# Patient Record
Sex: MALE | Race: BLACK OR AFRICAN AMERICAN | Employment: FULL TIME | URBAN - METROPOLITAN AREA
[De-identification: names, ages, dates, MRNs, and addresses within clinical notes are randomized per-mention and may not be internally consistent; named-entity substitution may affect disease eponyms.]

---

## 2017-03-20 ENCOUNTER — OFFICE VISIT (OUTPATIENT)
Dept: FAMILY MEDICINE CLINIC | Facility: CLINIC | Age: 52
End: 2017-03-20

## 2017-03-20 VITALS
RESPIRATION RATE: 14 BRPM | BODY MASS INDEX: 32.58 KG/M2 | HEIGHT: 68 IN | WEIGHT: 215 LBS | HEART RATE: 77 BPM | OXYGEN SATURATION: 98 % | SYSTOLIC BLOOD PRESSURE: 124 MMHG | TEMPERATURE: 98 F | DIASTOLIC BLOOD PRESSURE: 86 MMHG

## 2017-03-20 DIAGNOSIS — M25.531 RIGHT WRIST PAIN: Primary | ICD-10-CM

## 2017-03-20 DIAGNOSIS — Z87.39 HISTORY OF GOUT: ICD-10-CM

## 2017-03-20 PROCEDURE — 99213 OFFICE O/P EST LOW 20 MIN: CPT | Performed by: NURSE PRACTITIONER

## 2017-03-20 RX ORDER — PREDNISONE 20 MG/1
TABLET ORAL
Qty: 20 TABLET | Refills: 0 | Status: SHIPPED | OUTPATIENT
Start: 2017-03-20 | End: 2017-05-23 | Stop reason: ALTCHOICE

## 2017-03-20 NOTE — PATIENT INSTRUCTIONS
Eating to Prevent Gout  Gout is a painful form of arthritis caused by an excess of uric acid. This is a waste product made by the body. It builds up in the body and forms crystals that collect in the joints, bringing on a gout attack.  Alcohol and certain The following guidelines are recommended by the [address] [place] [address] for people with gout. Your diet should be:  · High in fiber, whole grains, fruits, and vegetables. · Low in protein (15% of calories should come from protein.  Choose lean sources · Other high fat foods such as gravy, whole milk, and high fat cheeses  · Vegetables such as asparagus, cauliflower, spinach, and mushrooms used to be thought to contribute to an increased risk for a gout attack, but recent studies show that high purine ve

## 2017-03-20 NOTE — PROGRESS NOTES
CHIEF COMPLAINT:     Patient presents with:  Hand Pain      HPI:   Stefany Madrigal is a 49year old male who presents with complaints of right wrist pain and swelling, base of thumb, pain was 9/10 upon awakening, this pain/swelling has been intermiitent for m LUNGS: Denies shortness of breath, cough, or wheezing  GI: Denies abdominal pain, N/V/C/D.   MUSCULOSKELETAL: no arthralgia or swollen joints  LYMPH:  Denies lymphadenopathy  NEURO: Denies headaches or lightheadedness      EXAM:   /86 mmHg  Pulse 77 Gout is a painful form of arthritis caused by an excess of uric acid. This is a waste product made by the body. It builds up in the body and forms crystals that collect in the joints, bringing on a gout attack.  Alcohol and certain foods can trigger a gout The following guidelines are recommended by the [address] [place] [address] for people with gout. Your diet should be:  · High in fiber, whole grains, fruits, and vegetables. · Low in protein (15% of calories should come from protein.  Choose lean sources · Other high fat foods such as gravy, whole milk, and high fat cheeses  · Vegetables such as asparagus, cauliflower, spinach, and mushrooms used to be thought to contribute to an increased risk for a gout attack, but recent studies show that high purine ve

## 2017-05-23 ENCOUNTER — OFFICE VISIT (OUTPATIENT)
Dept: FAMILY MEDICINE CLINIC | Facility: CLINIC | Age: 52
End: 2017-05-23

## 2017-05-23 VITALS
DIASTOLIC BLOOD PRESSURE: 78 MMHG | SYSTOLIC BLOOD PRESSURE: 110 MMHG | TEMPERATURE: 98 F | RESPIRATION RATE: 15 BRPM | HEART RATE: 96 BPM | OXYGEN SATURATION: 98 %

## 2017-05-23 DIAGNOSIS — B34.9 VIRAL SYNDROME: Primary | ICD-10-CM

## 2017-05-23 PROCEDURE — 99213 OFFICE O/P EST LOW 20 MIN: CPT | Performed by: PHYSICIAN ASSISTANT

## 2017-05-23 NOTE — PROGRESS NOTES
CHIEF COMPLAINT:   Patient presents with:  URI: fever, bodyache 2 days duration. HPI:   Hiram Chong is a 49year old male who presents for fever for  2  days Reports Tmax of 101. Patient also reports associated symptoms of:  bodyache.   Denies headac shortness of breath with exertion or rest. No cough or wheezing  GI: no abdominal pain, No N/V/C/D. No jaundice. JOINTS: no arthralgia or swollen joints  NEURO: no headaches.       EXAM:   /78 mmHg  Pulse 96  Temp(Src) 98.1 °F (36.7 °C) (Oral)  Resp

## 2017-08-09 PROBLEM — Z79.899 ON STATIN THERAPY: Status: ACTIVE | Noted: 2017-08-09

## 2017-11-10 ENCOUNTER — OFFICE VISIT (OUTPATIENT)
Dept: FAMILY MEDICINE CLINIC | Facility: CLINIC | Age: 52
End: 2017-11-10

## 2017-11-10 VITALS
TEMPERATURE: 98 F | HEIGHT: 67 IN | SYSTOLIC BLOOD PRESSURE: 120 MMHG | HEART RATE: 90 BPM | BODY MASS INDEX: 33.27 KG/M2 | RESPIRATION RATE: 14 BRPM | DIASTOLIC BLOOD PRESSURE: 86 MMHG | WEIGHT: 212 LBS

## 2017-11-10 DIAGNOSIS — M79.604 PAIN OF RIGHT LOWER EXTREMITY: Primary | ICD-10-CM

## 2017-11-10 DIAGNOSIS — Z87.39 H/O: GOUT: ICD-10-CM

## 2017-11-10 PROCEDURE — 99213 OFFICE O/P EST LOW 20 MIN: CPT | Performed by: NURSE PRACTITIONER

## 2017-11-10 RX ORDER — PREDNISONE 20 MG/1
TABLET ORAL
Qty: 10 TABLET | Refills: 0 | Status: SHIPPED | OUTPATIENT
Start: 2017-11-10 | End: 2018-01-26 | Stop reason: ALTCHOICE

## 2017-11-10 NOTE — PROGRESS NOTES
CHIEF COMPLAINT:     Patient presents with: Foot Pain      HPI:   Isidro Heller is a 47year old male who presents with complaints of pain to right foot. Began acutely yesterday, pain 8/10, persistent. Bearing weight exacerbates pain.   Pt reports h/o gou EYES: conjunctiva clear,  NOSE: nostrils patent  NECK: supple, non-tender  LUNGS: clear to auscultation bilaterally, no wheezes or rhonchi. Breathing is non labored.   CARDIO: RRR without murmur  EXTREMITIES: right foot, + erythema, pain and swelling noted Eating too many foods containing purines may increase the levels of uric acid in your body and increase your risk for a gout attack. It may be best to limit these high-purine foods:  · Alcohol (beer, red wine). You may be told to avoid alcohol completely. The patient indicates understanding of these issues and agrees to the plan.

## 2017-11-10 NOTE — PATIENT INSTRUCTIONS
Eating to Prevent Gout  Gout is a painful form of arthritis caused by an excess of uric acid. This is a waste product made by the body. It builds up in the body and forms crystals that collect in the joints, bringing on a gout attack.  Alcohol and certain The following guidelines are recommended by the [address] [place] [address] for people with gout. Your diet should be:  · High in fiber, whole grains, fruits, and vegetables. · Low in protein (15% of calories should come from protein.  Choose lean sources

## 2017-11-13 PROCEDURE — 84154 ASSAY OF PSA FREE: CPT | Performed by: PHYSICIAN ASSISTANT

## 2017-11-13 PROCEDURE — 84153 ASSAY OF PSA TOTAL: CPT | Performed by: PHYSICIAN ASSISTANT

## 2018-01-26 ENCOUNTER — OFFICE VISIT (OUTPATIENT)
Dept: FAMILY MEDICINE CLINIC | Facility: CLINIC | Age: 53
End: 2018-01-26

## 2018-01-26 VITALS
HEART RATE: 90 BPM | TEMPERATURE: 98 F | DIASTOLIC BLOOD PRESSURE: 90 MMHG | SYSTOLIC BLOOD PRESSURE: 110 MMHG | RESPIRATION RATE: 16 BRPM | OXYGEN SATURATION: 98 %

## 2018-01-26 DIAGNOSIS — H61.22 IMPACTED CERUMEN OF LEFT EAR: Primary | ICD-10-CM

## 2018-01-26 DIAGNOSIS — H91.92 HEARING LOSS OF LEFT EAR, UNSPECIFIED HEARING LOSS TYPE: ICD-10-CM

## 2018-01-26 PROCEDURE — 99213 OFFICE O/P EST LOW 20 MIN: CPT | Performed by: PHYSICIAN ASSISTANT

## 2018-01-26 RX ORDER — FLUTICASONE PROPIONATE 50 MCG
2 SPRAY, SUSPENSION (ML) NASAL DAILY
Qty: 1 INHALER | Refills: 0 | Status: SHIPPED | OUTPATIENT
Start: 2018-01-26 | End: 2018-07-06 | Stop reason: ALTCHOICE

## 2018-01-26 NOTE — PATIENT INSTRUCTIONS
Earwax Removal    The ear canal makes earwax from the canal’s lining. The ears make wax to lubricate and protect the ear canal. The ear canal is the tube that connects the middle ear to the outside of the ear.  The wax protects the ear from bacteria, infe · Don’t use cotton swabs in your ears. Cotton swabs may push wax deeper into the ear canal or damage the eardrum.  Use cotton gauze or a wet washcloth  to gently remove wax on the outside of the ear and around the opening to the ear canal.  · Don't use any © 9949-4030 [employer] [address]. [address] [facility] [place] [place]. All rights reserved. This information is not intended as a substitute for professional medical care. Always follow your healthcare professional's instructions.         Jonathon · Limit alcohol and caffeine. · Try to limit stress. During Attacks  If an attack is about to start or has started, take any medicine you have been prescribed for an attack. Lie down on a firm surface in a darkened room. Stay as still as possible.  Stay a

## 2018-01-26 NOTE — PROGRESS NOTES
CHIEF COMPLAINT:   Patient presents with:  Ear Problem: cant hear as well out of left ear. HPI:     Filomena Howard is a 47year old male who presents to clinic today with complaints of not hearing well out of the left ear. Has had for 3  days.    Amirah Valdez suspicious lesions  HEAD: atraumatic, normocephalic  EYES: conjunctiva clear, sclera non icteric. PERRLA. No nystagmus. EARS: non tender with manipulation. External auditory canals are patent on the right but occluded on the left.    Right TM: pearly g Comfort measures as described in Patient Instructions    Meds & Refills for this Visit:    Signed Prescriptions Disp Refills    Fluticasone Propionate 50 MCG/ACT Nasal Suspension 1 Inhaler 0      Si sprays by Each Nare route daily.              Risk

## 2018-01-30 PROBLEM — H83.02 LABYRINTHITIS OF LEFT EAR: Status: ACTIVE | Noted: 2018-01-30

## 2018-03-06 PROCEDURE — 82164 ANGIOTENSIN I ENZYME TEST: CPT | Performed by: OTOLARYNGOLOGY

## 2018-03-06 PROCEDURE — 86618 LYME DISEASE ANTIBODY: CPT | Performed by: OTOLARYNGOLOGY

## 2018-05-10 PROCEDURE — 87086 URINE CULTURE/COLONY COUNT: CPT | Performed by: PHYSICIAN ASSISTANT

## 2018-05-10 PROCEDURE — 87491 CHLMYD TRACH DNA AMP PROBE: CPT | Performed by: PHYSICIAN ASSISTANT

## 2018-05-10 PROCEDURE — 87591 N.GONORRHOEAE DNA AMP PROB: CPT | Performed by: PHYSICIAN ASSISTANT

## 2018-07-06 ENCOUNTER — OFFICE VISIT (OUTPATIENT)
Dept: FAMILY MEDICINE CLINIC | Facility: CLINIC | Age: 53
End: 2018-07-06

## 2018-07-06 VITALS
HEART RATE: 86 BPM | RESPIRATION RATE: 16 BRPM | TEMPERATURE: 98 F | SYSTOLIC BLOOD PRESSURE: 118 MMHG | DIASTOLIC BLOOD PRESSURE: 80 MMHG | OXYGEN SATURATION: 98 %

## 2018-07-06 DIAGNOSIS — M70.21 OLECRANON BURSITIS OF RIGHT ELBOW: Primary | ICD-10-CM

## 2018-07-06 DIAGNOSIS — M10.9 ACUTE GOUT OF RIGHT ELBOW, UNSPECIFIED CAUSE: ICD-10-CM

## 2018-07-06 PROCEDURE — 99213 OFFICE O/P EST LOW 20 MIN: CPT | Performed by: PHYSICIAN ASSISTANT

## 2018-07-06 RX ORDER — CEFADROXIL 500 MG/1
CAPSULE ORAL
Qty: 14 CAPSULE | Refills: 0 | Status: SHIPPED | OUTPATIENT
Start: 2018-07-06 | End: 2018-07-31

## 2018-07-06 RX ORDER — INDOMETHACIN 50 MG/1
50 CAPSULE ORAL
Qty: 30 CAPSULE | Refills: 0 | Status: SHIPPED | OUTPATIENT
Start: 2018-07-06 | End: 2018-07-16

## 2018-07-06 NOTE — PROGRESS NOTES
CHIEF COMPLAINT:     Patient presents with:  Elbow Pain: right elbow. HPI:   Mago Prather is a 47year old left handed male who presents with complaints of right elbow pain and swelling.   He notes yesterday developed some posterior right elbow pain an Denies chest pain, or palpitations  LUNGS: Denies shortness of breath, cough, or wheezing  GI: Denies abdominal pain, N/V/C/D.   MUSCULOSKELETAL: per HPI.   LYMPH:  Denies lymphadenopathy  NEURO: Denies headaches or lightheadedness      EXAM:   /80 protein diet and etoh. Follow up PCP.   Comfort care instructions as listed in Patient Instructions    Meds & Refills for this Visit:    Signed Prescriptions Disp Refills    indomethacin 50 MG Oral Cap 30 capsule 0      Sig: Take 1 capsule (50 mg total) b

## 2018-07-31 PROBLEM — M70.21 OLECRANON BURSITIS OF RIGHT ELBOW: Status: ACTIVE | Noted: 2018-07-31

## 2018-11-12 PROBLEM — M70.21 OLECRANON BURSITIS OF RIGHT ELBOW: Status: RESOLVED | Noted: 2018-07-31 | Resolved: 2018-11-12

## 2018-11-12 PROBLEM — H83.02 LABYRINTHITIS OF LEFT EAR: Status: RESOLVED | Noted: 2018-01-30 | Resolved: 2018-11-12

## 2019-01-03 PROCEDURE — 88305 TISSUE EXAM BY PATHOLOGIST: CPT | Performed by: INTERNAL MEDICINE

## 2019-07-05 PROCEDURE — 87086 URINE CULTURE/COLONY COUNT: CPT | Performed by: INTERNAL MEDICINE

## 2019-12-11 ENCOUNTER — OFFICE VISIT (OUTPATIENT)
Dept: FAMILY MEDICINE CLINIC | Facility: CLINIC | Age: 54
End: 2019-12-11
Payer: COMMERCIAL

## 2019-12-11 VITALS
WEIGHT: 224 LBS | BODY MASS INDEX: 33.95 KG/M2 | DIASTOLIC BLOOD PRESSURE: 94 MMHG | OXYGEN SATURATION: 98 % | HEIGHT: 68 IN | SYSTOLIC BLOOD PRESSURE: 128 MMHG | HEART RATE: 78 BPM | TEMPERATURE: 98 F | RESPIRATION RATE: 16 BRPM

## 2019-12-11 DIAGNOSIS — M10.9 ACUTE GOUT INVOLVING TOE OF RIGHT FOOT, UNSPECIFIED CAUSE: Primary | ICD-10-CM

## 2019-12-11 PROCEDURE — 99213 OFFICE O/P EST LOW 20 MIN: CPT | Performed by: PHYSICIAN ASSISTANT

## 2019-12-11 RX ORDER — PREDNISONE 20 MG/1
TABLET ORAL
Qty: 13 TABLET | Refills: 0 | Status: SHIPPED | OUTPATIENT
Start: 2019-12-11 | End: 2020-03-04 | Stop reason: ALTCHOICE

## 2019-12-11 NOTE — PATIENT INSTRUCTIONS
Gout    Gout is an inflammation of a joint due to a build-up of gout crystals in the joint fluid. This occurs when there is an excess of uric acid (a normal waste product) in the body.  Uric acid builds up in the body when the kidneys are unable to filter · If pain medicines have been prescribed, take them exactly as directed.    Preventing attacks  · Minimize or avoid alcohol use. Excess alcohol intake can cause a gout attack. · Limit these foods and beverages:  ? Organ meats, such as kidneys and liver  ? Gout is a painful condition caused by an excess of uric acid, a waste product made by the body. Uric acid forms crystals that collect in the joints. The immune response to these crystals brings on symptoms of joint pain and swelling.  This is called a gout · Complex carbohydrate foods, including whole grains, brown rice, oats, and beans  · Coffee, in moderation  · Water, approximately 64 ounces per day  Follow-up care  Follow up with your healthcare provider as advised.   When to seek medical advice  Call you

## 2019-12-11 NOTE — PROGRESS NOTES
CHIEF COMPLAINT:     Patient presents with:  Gout      HPI:   Stefany Madrigal is a 49year old male  who presents with complaints of \"gout. \" right great toe has been hurting for since this morning.   Patient  has history of gout in the past.  Pain is rated a Anti inflammatories as listed above. Push fluids to hydrate, minimize ETOH and high protein diets. Follow up with PCP to discuss maintenance therapies. Medication risks reviewed. Watch for signs of acute kidney injury, or GI bleeding.   Delay flu shot to · Apply an ice pack (ice cubes in a plastic bag wrapped in a thin towel) over the injured area for 20 minutes every 1 to 2 hours the first day for pain relief. Continue this 3 to 4 times a day for swelling and pain.   · Avoid alcohol and foods listed below © 0262-9903 [employer] [address]. [address] [facility] [place] [place]. All rights reserved. This information is not intended as a substitute for professional medical care. Always follow your healthcare professional's instructions.         Hollie Encarnacion · Omega fatty acids. These are found in some fatty fish such as salmon, certain oils (flax, olive, or nut), and nuts themselves. Omega fatty acids may help prevent inflammation due to gout.   · Dairy products that are low-fat or fat-free, such as cheese and

## (undated) NOTE — LETTER
Date: 12/11/2019    Patient Name: Felipe Bettencourt          To Whom it may concern: This letter has been written at the patient's request. The above patient was seen at the [facility] for treatment of a medical condition.   Please excuse his ab

## (undated) NOTE — Clinical Note
Date: 5/23/2017    Patient Name: Filomena Howard          To Whom it may concern: This letter has been written at the patient's request. The above patient was seen at the [facility] for treatment of a medical condition.     This patient should

## (undated) NOTE — MR AVS SNAPSHOT
[facility] [place]  [address] [address]  [phone]               Thank you for choosing us for your health care visit with KYREE Santoro. We are glad to serve you and happy to provide you with this summary of your visit. · Sauces and gravies made with meat  · Organ meats (such as liver, kidneys, sweetbreads, and tripe)  · Legumes (such as dried beans, peas)  · Mushrooms, spinach, asparagus, and cauliflower  · Yeast and yeast extract supplements  Foods to try  Some foods ma health care provider will help you determine the best eating plan for you.     Eating to manage gout  Weight loss for those who are overweight may help reduce gout attacks.   Eat less of these foods  Eating too many foods containing purines may raise the le big toe  · Affected joint is hard to move  · Skin of the affected joint is purple or red  · Fever of 100.4°F (38°C) or higher  · Pain that doesn't get better even with prescribed medicine   Date Last Reviewed: 1/12/2016  © 4039-7315 [employer] Phone:  [phone]    - predniSONE 20 MG Tabs            [employer]     Sign up for [employer], your secure online medical record. [employer] will allow you to access patient instructions from your recent visit,  view other health information, and more.  To sig Get your heart pumping – brisk walking, biking, swimming Even 10 minute increments are effective and add up over the week   2 ½ hours per week – spread out over time Use a kvng to keep you motivated   Don’t forget strength training with weights and resist

## (undated) NOTE — Clinical Note
[place],  I saw your pt in [facility] for foot pain. I reviewed labs and saw pt has not rechecked PSA as you advised in 8/2017. I advised pt on importance of rechecking PSA and f/u with you. Pt understands, agrees with plan.    States he will have PSA rechec

## (undated) NOTE — LETTER
Date: 1/26/2018    Patient Name: Melvin Ochoa          To Whom it may concern: This letter has been written at the patient's request. The above patient was seen at the [facility] for treatment of a medical condition.   Please excuse from wo

## (undated) NOTE — LETTER
Date: 11/10/2017    Patient Name: Mago Prather          To Whom it may concern: This letter has been written at the patient's request. The above patient was seen at the [facility] for treatment of a medical condition.     This patient shoul

## (undated) NOTE — MR AVS SNAPSHOT
[facility] [place]  [address] [place] [address]  [phone]               Thank you for choosing us for your health care visit with NARINDER Mckeon. We are glad to serve you and happy to provide you with this summary of your visit.   Ple this. If you have chronic liver or kidney disease or ever had a stomach ulcer or GI bleeding, talk with your doctor before using these medicines. No one who is younger than 25 and ill with a fever should take aspirin. It may cause severe disease or death. · Fever of 100.4°F (38°C) or higher, or as directed by your healthcare provider  Date Last Reviewed: 9/25/2015  © 7287-9394 The [address] [place] [address], [address] [place] [address]. All rights reserved.  This information is not intended as a sub Visit [facility] online at  [facility]tn

## (undated) NOTE — Clinical Note
Date: 3/20/2017    Patient Name: Hayden Garcia          To Whom it may concern: The above patient was seen at the [facility] for treatment of a medical condition. This patient should be excused from attending work.   Patient was unable to